# Patient Record
Sex: FEMALE | Race: ASIAN | URBAN - METROPOLITAN AREA
[De-identification: names, ages, dates, MRNs, and addresses within clinical notes are randomized per-mention and may not be internally consistent; named-entity substitution may affect disease eponyms.]

---

## 2019-01-21 ENCOUNTER — OFFICE VISIT (OUTPATIENT)
Dept: INTERNAL MEDICINE CLINIC | Age: 29
End: 2019-01-21

## 2019-01-21 VITALS
TEMPERATURE: 98.2 F | BODY MASS INDEX: 26.49 KG/M2 | SYSTOLIC BLOOD PRESSURE: 129 MMHG | HEART RATE: 78 BPM | DIASTOLIC BLOOD PRESSURE: 84 MMHG | HEIGHT: 65 IN | OXYGEN SATURATION: 98 % | WEIGHT: 159 LBS | RESPIRATION RATE: 18 BRPM

## 2019-01-21 DIAGNOSIS — F90.0 ATTENTION DEFICIT HYPERACTIVITY DISORDER (ADHD), PREDOMINANTLY INATTENTIVE TYPE: ICD-10-CM

## 2019-01-21 DIAGNOSIS — Z01.84 IMMUNITY STATUS TESTING: ICD-10-CM

## 2019-01-21 DIAGNOSIS — Z76.89 ENCOUNTER TO ESTABLISH CARE: Primary | ICD-10-CM

## 2019-01-21 DIAGNOSIS — H00.11 CHALAZION OF RIGHT UPPER EYELID: ICD-10-CM

## 2019-01-21 LAB — HBV SURFACE AB SER RIA-ACNC: DETECTED

## 2019-01-21 RX ORDER — DEXTROAMPHETAMINE SACCHARATE, AMPHETAMINE ASPARTATE, DEXTROAMPHETAMINE SULFATE AND AMPHETAMINE SULFATE 3.75; 3.75; 3.75; 3.75 MG/1; MG/1; MG/1; MG/1
TABLET ORAL
Refills: 0 | COMMUNITY
Start: 2018-12-28 | End: 2019-01-21 | Stop reason: SDUPTHER

## 2019-01-21 RX ORDER — NEOMYCIN SULFATE, POLYMYXIN B SULFATE, AND DEXAMETHASONE 3.5; 10000; 1 MG/G; [USP'U]/G; MG/G
OINTMENT OPHTHALMIC
Qty: 3.5 G | Refills: 0 | Status: SHIPPED | OUTPATIENT
Start: 2019-01-21

## 2019-01-21 RX ORDER — DEXTROAMPHETAMINE SACCHARATE, AMPHETAMINE ASPARTATE, DEXTROAMPHETAMINE SULFATE AND AMPHETAMINE SULFATE 3.75; 3.75; 3.75; 3.75 MG/1; MG/1; MG/1; MG/1
15 TABLET ORAL 2 TIMES DAILY
Qty: 60 TAB | Refills: 0 | Status: SHIPPED | OUTPATIENT
Start: 2019-01-21 | End: 2019-02-20

## 2019-01-21 RX ORDER — POLYMYXIN B SULFATE AND TRIMETHOPRIM 1; 10000 MG/ML; [USP'U]/ML
SOLUTION OPHTHALMIC
Refills: 0 | COMMUNITY
Start: 2019-01-07

## 2019-01-21 NOTE — PROGRESS NOTES
Outgoing call to [facility]. Patient requesting Hepatis B Surface AB Quantitative. Unable to find lab under . Per lab representative it is okay to write   \"quantitative\" on lab requisition.

## 2019-01-21 NOTE — PROGRESS NOTES
ROOM # 9    Cayetano Rowley presents today for   Chief Complaint   Patient presents with   [address]     needs labs       Cayetano Rowley preferred language for health care discussion is english/other. Is someone accompanying this pt? no    Is the patient using any DME equipment during OV? no    Depression Screening:  No flowsheet data found. Learning Assessment:  No flowsheet data found. Abuse Screening:  No flowsheet data found. Fall Risk  No flowsheet data found. Health Maintenance reviewed and discussed per provider. Yes    Cayetano Rowley is due for   Health Maintenance Due   Topic Date Due    DTaP/Tdap/Td series (1 - Tdap) 10/11/2011    PAP AKA CERVICAL CYTOLOGY  10/11/2011    Influenza Age 9 to Adult  08/01/2018    MEDICARE YEARLY EXAM  01/18/2019         Please order/place referral if appropriate. Advance Directive:  1. Do you have an advance directive in place? Patient Reply: no    2. If not, would you like material regarding how to put one in place? Patient Reply: no    Coordination of Care:  1. Have you been to the ER, urgent care clinic since your last visit? Hospitalized since your last visit? no    2. Have you seen or consulted any other health care providers outside of the [address] [place] [address] since your last visit? Include any pap smears or colon screening.  no

## 2019-01-21 NOTE — PROGRESS NOTES
HISTORY OF PRESENT ILLNESS  Duncan Flores is a 29 y.o. female. Establish Care   The history is provided by the patient. Pertinent negatives include no chest pain, no abdominal pain, no headaches and no shortness of breath. Attention Deficit Disorder   The history is provided by the patient. This is a chronic problem. Episode onset: more than 10 years. The problem occurs constantly. The problem has not changed since onset. Pertinent negatives include no chest pain, no abdominal pain, no headaches and no shortness of breath. Nothing aggravates the symptoms. The symptoms are relieved by medications. Treatments tried: adderall. The treatment provided significant relief. Eye Swelling    The history is provided by the patient. This is a new problem. The current episode started more than 1 week ago. The problem occurs constantly. The problem has been gradually improving. The right eye is affected. The injury mechanism was a direct trauma (eye brow waxing). The pain is at a severity of 2/10. The pain is mild. There is history of trauma to the eye. There is no known exposure to pink eye. She does not wear contacts. Associated symptoms include eye redness. Pertinent negatives include no numbness, no blurred vision, no decreased vision, no discharge, no double vision, no foreign body sensation, no photophobia, no nausea, no vomiting, no fever, no pain, no blindness, no head injury and no dizziness. She has tried eye drops for the symptoms. The treatment provided moderate relief. Review of Systems   Constitutional: Negative for chills, fever and malaise/fatigue. HENT: Negative for congestion. Eyes: Positive for redness. Negative for blindness, blurred vision, double vision, photophobia, pain and discharge. Respiratory: Negative for cough and shortness of breath. Cardiovascular: Negative for chest pain and palpitations. Gastrointestinal: Negative for abdominal pain, heartburn, nausea and vomiting. Musculoskeletal: Negative for myalgias. Neurological: Negative for dizziness, numbness and headaches. Psychiatric/Behavioral: The patient is not nervous/anxious. Physical Exam   Constitutional: She is oriented to person, place, and time. She appears well-developed and well-nourished. HENT:   Head: Normocephalic. Mouth/Throat: Oropharynx is clear and moist.   Eyes: Conjunctivae are normal. Pupils are equal, round, and reactive to light. Right eye exhibits no discharge and no exudate. Left eye exhibits no discharge and no exudate. Neck: Neck supple. Cardiovascular: Regular rhythm. Pulmonary/Chest: Breath sounds normal. No respiratory distress. She has no wheezes. Lymphadenopathy:     She has no cervical adenopathy. Neurological: She is alert and oriented to person, place, and time. No cranial nerve deficit. Coordination normal.   Skin: Skin is dry. Psychiatric: She has a normal mood and affect. Nursing note and vitals reviewed. ASSESSMENT and PLAN    ICD-10-CM ICD-9-CM    1. Encounter to establish care Z76.89 V65.8    2. Attention deficit hyperactivity disorder (ADHD), predominantly inattentive type F90.0 314.00 dextroamphetamine-amphetamine (ADDERALL) 15 mg tablet   3. Immunity status testing Z01.84 V72.61 HEP B SURFACE AB      HEP B SURFACE AB   4. Chalazion of right upper eyelid H00.11 373.2 neomycin-polymyxin-dexamethasone (MAXITROL) 3.5 mg/g-10,000 unit/g-0.1 % ophthalmic ointment   patient is advised to get all records from previous PCP and bring them here for record. Report any worsening mood or depression immediately.

## 2019-01-21 NOTE — PATIENT INSTRUCTIONS
Attention Deficit Hyperactivity Disorder (ADHD) in Adults: Care Instructions  Your Care Instructions    Attention deficit hyperactivity disorder, or ADHD, is a condition that makes it hard to pay attention. So you may have problems when you try to focus, get organized, and finish tasks. It might make you more active than other people. Or you might do things without thinking first.  ADHD is very common. It usually starts in early childhood. Many adults don't realize they have it until their children are diagnosed. Then they become aware of their own symptoms. Doctors don't know what causes ADHD. But it often runs in families. ADHD can be treated with medicines, behavior training, and counseling. Treatment can improve your life. Follow-up care is a key part of your treatment and safety. Be sure to make and go to all appointments, and call your doctor if you are having problems. It's also a good idea to know your test results and keep a list of the medicines you take. How can you care for yourself at home? · Learn all you can about ADHD. This will help you and your family understand it better. · Take your medicines exactly as prescribed. Call your doctor if you think you are having a problem with your medicine. You will get more details on the specific medicines your doctor prescribes. · If you miss a dose of your medicine, do not take an extra dose. · If your doctor suggests counseling, find a counselor you like and trust. Talk openly and honestly. Be willing to make some changes. · Find a support group for adults with ADHD. Talking to others with the same problems can help you feel better. It can also give you ideas about how to best cope with the condition. · Get rid of distractions at your work space. Keep your desk clean. Try not to face a window or busy hallway. · Use files, planners, and other tools to keep you organized. · Limit use of alcohol, and do not use illegal drugs.  People with ADHD tend to become addicted more easily than others. Tell your doctor if you need help to quit. Counseling, support groups, and sometimes medicines can help you stay free of alcohol or drugs. · Get at least 30 minutes of physical activity on most days of the week. Exercise has been shown to help people cope with ADHD. Walking is a good choice. You also may want to do other activities, such as running, swimming, cycling, or playing tennis or team sports. When should you call for help? Watch closely for changes in your health, and be sure to contact your doctor if:    · You feel sad a lot or cry all the time.     · You have trouble sleeping, or you sleep too much.     · You find it hard to concentrate, make decisions, or remember things.     · You change how you normally eat.     · You feel guilty for no reason. Where can you learn more? Go to http://jazz-ananth.info/. Enter B196 in the search box to learn more about \"Attention Deficit Hyperactivity Disorder (ADHD) in Adults: Care Instructions. \"  Current as of: September 11, 2018  Content Version: 11.9  © 3406-3408 [employer]. Care instructions adapted under license by [employer] (which disclaims liability or warranty for this information). If you have questions about a medical condition or this instruction, always ask your healthcare professional. [place] [address] any warranty or liability for your use of this information.

## 2019-01-25 ENCOUNTER — TELEPHONE (OUTPATIENT)
Dept: INTERNAL MEDICINE CLINIC | Age: 29
End: 2019-01-25

## 2019-01-26 DIAGNOSIS — Z78.9 IMMUNE TO HEPATITIS B: Primary | ICD-10-CM

## 2019-01-26 DIAGNOSIS — Z78.9 IMMUNE TO HEPATITIS B: ICD-10-CM

## 2019-01-26 NOTE — TELEPHONE ENCOUNTER
Patient called the office back requesting lab results. Patient was advised SHANTAL Stan was out of the office but Dr. Perez Marinelli reviewed her lab results and it indicated she has immunity to Hep B. Patient is requesting a test that shows a quantitative value for Hep B. Patient states she needs to have the results by 01/29/19 or she will be dropped from school. Patient was advised Dr. Perez Marinelli is willing to order the test that shows a quantitative value.  Patient verbalized understanding and had no further questions

## 2019-01-26 NOTE — TELEPHONE ENCOUNTER
Spoke with lab directly and they instructed me what test to order - HBV quant PCR. I told them I was looking for quantitative Hep B surface ab and she said this was the correct test.  I think this is actually PCR for Hep B viral load but we'll see what results back.

## 2019-01-27 LAB — Lab: 86.2

## 2019-01-28 NOTE — TELEPHONE ENCOUNTER
Patient called the office requesting her lab results. Patient was advised we received received Hep B quantitative test results. Patient was advised she could stop by the office to collect a copy of the report. Patient verbalized understanding and had no further questions.

## 2019-02-12 ENCOUNTER — TELEPHONE (OUTPATIENT)
Dept: INTERNAL MEDICINE CLINIC | Age: 29
End: 2019-02-12

## 2019-02-13 NOTE — TELEPHONE ENCOUNTER
Prior authorization dextroamphetamine-amphetamine 15 mg tablet initiated via CoverMyMeds. Received the following message: Your PA has been faxed to the plan as a paper copy. Please contact the plan directly if you haven't received a determination in a typical timeframe. You will be notified of the determination via fax.

## 2019-02-18 NOTE — TELEPHONE ENCOUNTER
Leonel Espinoza from [place] called to speak with Nurse regarding PA. Please contact at [phone].

## 2019-02-18 NOTE — TELEPHONE ENCOUNTER
Returned call to [facility] with [employer]. [facility] calling to get update on prior authorization. Outgoing call to [facility] this afternoon. Spoke with prior authorization representative. The incorrect form was completed. Representative faxing correct form. Will await fax.

## 2019-02-19 NOTE — TELEPHONE ENCOUNTER
Incoming fax from [facility]. Patient's insurance plan is requiring drug screen within the past 6 months. Outgoing call to patient this morning. Two patient identifiers confirmed. Informed patient of the following. Patient will come to office this week to provide urine sample.

## 2019-02-20 ENCOUNTER — LAB ONLY (OUTPATIENT)
Dept: INTERNAL MEDICINE CLINIC | Age: 29
End: 2019-02-20

## 2019-02-20 DIAGNOSIS — Z02.83 ENCOUNTER FOR DRUG SCREENING: Primary | ICD-10-CM

## 2019-02-20 DIAGNOSIS — Z02.83 ENCOUNTER FOR DRUG SCREENING: ICD-10-CM

## 2019-03-01 LAB
MISCELLANEOUS TEST,99000: NORMAL
SENT TO, 434: NORMAL
TEST NAME, 435: NORMAL

## 2019-03-11 NOTE — TELEPHONE ENCOUNTER
[employer] contacted to inform that UDS for this pt has been completed. Was informed that new Prior Auth now needs to be completed. Will fax over paper work to be completed and faxed back with pt UDS.

## 2019-03-12 NOTE — TELEPHONE ENCOUNTER
Received fax from [facility] department. Pt medication Amphetamine/dextroamphetamine has been has been reviewed and approved. An Authorization has been entered into the pharmacy computer system. Pt contacted and informed of approval.  Pt verbalized understanding. Pt states she did pay out of pocket for previous prescription and she will be needing a refill soon. Pt advised that she will need to schedule an appointment with CLAUDETTE Hooper when she is ready to refill her medication. Pt verbalized understanding. No other questions or concerns at this time.